# Patient Record
Sex: FEMALE | Race: WHITE | NOT HISPANIC OR LATINO | Employment: OTHER | URBAN - METROPOLITAN AREA
[De-identification: names, ages, dates, MRNs, and addresses within clinical notes are randomized per-mention and may not be internally consistent; named-entity substitution may affect disease eponyms.]

---

## 2018-08-20 RX ORDER — METOPROLOL TARTRATE 50 MG/1
TABLET, FILM COATED ORAL
Qty: 180 TABLET | Refills: 3 | Status: SHIPPED | OUTPATIENT
Start: 2018-08-20 | End: 2019-08-18 | Stop reason: SDUPTHER

## 2018-10-24 ENCOUNTER — OFFICE VISIT (OUTPATIENT)
Dept: INTERNAL MEDICINE | Facility: CLINIC | Age: 68
End: 2018-10-24

## 2018-10-24 VITALS
DIASTOLIC BLOOD PRESSURE: 78 MMHG | BODY MASS INDEX: 35.63 KG/M2 | WEIGHT: 227 LBS | SYSTOLIC BLOOD PRESSURE: 128 MMHG | HEIGHT: 67 IN

## 2018-10-24 DIAGNOSIS — I25.10 CORONARY ARTERY DISEASE INVOLVING NATIVE CORONARY ARTERY OF NATIVE HEART WITHOUT ANGINA PECTORIS: ICD-10-CM

## 2018-10-24 DIAGNOSIS — R80.9 MICROALBUMINURIA DUE TO TYPE 2 DIABETES MELLITUS (HCC): ICD-10-CM

## 2018-10-24 DIAGNOSIS — E11.29 CONTROLLED TYPE 2 DIABETES MELLITUS WITH MICROALBUMINURIA, WITHOUT LONG-TERM CURRENT USE OF INSULIN (HCC): Primary | ICD-10-CM

## 2018-10-24 DIAGNOSIS — N18.4 CKD (CHRONIC KIDNEY DISEASE) STAGE 4, GFR 15-29 ML/MIN (HCC): Chronic | ICD-10-CM

## 2018-10-24 DIAGNOSIS — R80.9 CONTROLLED TYPE 2 DIABETES MELLITUS WITH MICROALBUMINURIA, WITHOUT LONG-TERM CURRENT USE OF INSULIN (HCC): Primary | ICD-10-CM

## 2018-10-24 DIAGNOSIS — E78.01 FAMILIAL HYPERCHOLESTEROLEMIA: ICD-10-CM

## 2018-10-24 DIAGNOSIS — I10 HTN (HYPERTENSION), BENIGN: ICD-10-CM

## 2018-10-24 DIAGNOSIS — E11.29 MICROALBUMINURIA DUE TO TYPE 2 DIABETES MELLITUS (HCC): ICD-10-CM

## 2018-10-24 PROBLEM — G43.001 MIGRAINE WITHOUT AURA AND WITH STATUS MIGRAINOSUS, NOT INTRACTABLE: Status: ACTIVE | Noted: 2017-10-19

## 2018-10-24 PROBLEM — L30.9 DERMATITIS: Status: ACTIVE | Noted: 2017-10-19

## 2018-10-24 PROBLEM — Z87.81 HISTORY OF FRACTURE OF LEFT HIP: Status: ACTIVE | Noted: 2017-10-19

## 2018-10-24 PROBLEM — L29.9 PRURITIC DERMATITIS: Status: ACTIVE | Noted: 2017-10-19

## 2018-10-24 PROBLEM — E78.5 HYPERLIPIDEMIA: Status: ACTIVE | Noted: 2018-10-24

## 2018-10-24 PROBLEM — J45.909 ASTHMA: Status: ACTIVE | Noted: 2018-10-24

## 2018-10-24 PROBLEM — M62.838 MUSCLE SPASMS OF LOWER EXTREMITY: Status: ACTIVE | Noted: 2017-10-19

## 2018-10-24 PROBLEM — Q74.2 CONGENITAL FOOT ABNORMALITY: Status: ACTIVE | Noted: 2017-04-24

## 2018-10-24 PROCEDURE — 99214 OFFICE O/P EST MOD 30 MIN: CPT | Performed by: INTERNAL MEDICINE

## 2018-10-24 RX ORDER — ALBUTEROL SULFATE 90 UG/1
2 AEROSOL, METERED RESPIRATORY (INHALATION)
COMMUNITY
Start: 2018-04-18 | End: 2020-12-03 | Stop reason: SDUPTHER

## 2018-10-24 RX ORDER — ATORVASTATIN CALCIUM 40 MG/1
TABLET, FILM COATED ORAL
COMMUNITY
Start: 2018-01-18 | End: 2019-01-15 | Stop reason: SDUPTHER

## 2018-10-24 RX ORDER — HYDROXYZINE HYDROCHLORIDE 10 MG/1
10 TABLET, FILM COATED ORAL
COMMUNITY
Start: 2015-06-17

## 2018-10-24 RX ORDER — TORSEMIDE 100 MG/1
50 TABLET ORAL
COMMUNITY
Start: 2018-04-20 | End: 2019-10-31 | Stop reason: SDUPTHER

## 2018-10-24 RX ORDER — SUMATRIPTAN 100 MG/1
100 TABLET, FILM COATED ORAL
COMMUNITY
Start: 2018-04-19 | End: 2019-01-15 | Stop reason: SDUPTHER

## 2018-10-24 RX ORDER — TRIAMCINOLONE ACETONIDE 1 MG/G
CREAM TOPICAL
COMMUNITY
Start: 2018-04-19 | End: 2019-01-28 | Stop reason: SDUPTHER

## 2018-10-24 RX ORDER — CETIRIZINE HYDROCHLORIDE 10 MG/1
10 TABLET ORAL
COMMUNITY

## 2018-10-24 RX ORDER — LANCETS
EACH MISCELLANEOUS
COMMUNITY
Start: 2017-02-03

## 2018-10-24 RX ORDER — BETAMETHASONE DIPROPIONATE 0.5 MG/G
CREAM TOPICAL
COMMUNITY
Start: 2017-03-29

## 2018-10-24 RX ORDER — MONTELUKAST SODIUM 10 MG/1
TABLET ORAL
COMMUNITY
Start: 2018-10-15 | End: 2019-01-15 | Stop reason: SDUPTHER

## 2018-10-24 RX ORDER — CLOBETASOL PROPIONATE 0.5 MG/G
CREAM TOPICAL
COMMUNITY
Start: 2018-07-03

## 2018-10-24 NOTE — PROGRESS NOTES
Subjective   Suzanna Denny is a 68 y.o. female. Presents with a chief complaint of DM2, CKDIII, Microalbuminuria, HTN, Hyperlipidemia, CAD, here for follow up. Will do a full set labs.    History of Present Illness     The following portions of the patient's history were reviewed and updated as appropriate: allergies, current medications, past family history, past medical history, past social history, past surgical history and problem list.    Review of Systems   Constitutional: Negative.    HENT: Negative.    Eyes: Negative.    Respiratory: Negative.    Cardiovascular: Negative.    Gastrointestinal: Negative.    Endocrine: Negative.    Genitourinary: Negative.    Musculoskeletal: Positive for arthralgias.   Skin: Negative.    Allergic/Immunologic: Negative.    Neurological: Negative.    Hematological: Negative.    Psychiatric/Behavioral: Negative.        Objective   Physical Exam   Constitutional: She is oriented to person, place, and time. She appears well-developed and well-nourished.   HENT:   Head: Normocephalic and atraumatic.   Eyes: Pupils are equal, round, and reactive to light. EOM are normal.   Neck: Normal range of motion. Neck supple.   Cardiovascular: Normal rate, regular rhythm and normal heart sounds.    Pulmonary/Chest: Effort normal and breath sounds normal.   Abdominal: Soft. Bowel sounds are normal.   Musculoskeletal: Normal range of motion.   Neurological: She is alert and oriented to person, place, and time.   Skin: Skin is warm and dry.   Psychiatric: She has a normal mood and affect. Her behavior is normal. Judgment and thought content normal.   Nursing note and vitals reviewed.        Assessment/Plan   Suzanna was seen today for hypertension and hyperlipidemia.    Diagnoses and all orders for this visit:    Controlled type 2 diabetes mellitus with microalbuminuria, without long-term current use of insulin (CMS/[facility])  Comments:  will check an A1c  Orders:  -     Hemoglobin A1c;  Future    Microalbuminuria due to type 2 diabetes mellitus (CMS/[facility])  Comments:  tighten up diabetic control    CKD (chronic kidney disease) stage 4, GFR 15-29 ml/min (CMS/[facility])  Comments:  check a CMP    Familial hypercholesterolemia  Comments:  will check lipids  Orders:  -     Lipid Panel; Future  -     Comprehensive Metabolic Panel; Future    HTN (hypertension), benign  Comments:  well controlled  Orders:  -     Comprehensive Metabolic Panel; Future  -     TSH; Future  -     CBC & Differential; Future    Coronary artery disease involving native coronary artery of native heart without angina pectoris  Comments:  stable for now

## 2018-10-26 LAB
ALBUMIN SERPL-MCNC: 4.6 G/DL (ref 3.5–5.2)
ALBUMIN/GLOB SERPL: 2 G/DL
ALP SERPL-CCNC: 111 U/L (ref 39–117)
ALT SERPL-CCNC: 21 U/L (ref 1–33)
AST SERPL-CCNC: 19 U/L (ref 1–32)
BASOPHILS # BLD AUTO: 0.05 10*3/MM3 (ref 0–0.2)
BASOPHILS NFR BLD AUTO: 0.7 % (ref 0–1.5)
BILIRUB SERPL-MCNC: 0.4 MG/DL (ref 0.1–1.2)
BUN SERPL-MCNC: 59 MG/DL (ref 8–23)
BUN/CREAT SERPL: 14.9 (ref 7–25)
CALCIUM SERPL-MCNC: 9.2 MG/DL (ref 8.6–10.5)
CHLORIDE SERPL-SCNC: 110 MMOL/L (ref 98–107)
CHOLEST SERPL-MCNC: 118 MG/DL (ref 0–200)
CO2 SERPL-SCNC: 20.2 MMOL/L (ref 22–29)
CREAT SERPL-MCNC: 3.95 MG/DL (ref 0.57–1)
EOSINOPHIL # BLD AUTO: 0.41 10*3/MM3 (ref 0–0.7)
EOSINOPHIL # BLD AUTO: 5.5 % (ref 0.3–6.2)
ERYTHROCYTE [DISTWIDTH] IN BLOOD BY AUTOMATED COUNT: 13.7 % (ref 11.7–13)
GLOBULIN SER CALC-MCNC: 2.3 GM/DL
GLUCOSE SERPL-MCNC: 97 MG/DL (ref 65–99)
HBA1C MFR BLD: 5.2 % (ref 4.8–5.6)
HCT VFR BLD AUTO: 36.4 % (ref 35.6–45.5)
HDLC SERPL-MCNC: 36 MG/DL (ref 40–60)
HGB BLD-MCNC: 11.4 G/DL (ref 11.9–15.5)
IMM GRANULOCYTES # BLD: 0.02 10*3/MM3 (ref 0–0.03)
IMM GRANULOCYTES NFR BLD: 0.3 % (ref 0–0.5)
LDLC SERPL CALC-MCNC: 63 MG/DL (ref 0–100)
LYMPHOCYTES # BLD AUTO: 0.96 10*3/MM3 (ref 0.9–4.8)
LYMPHOCYTES NFR BLD AUTO: 12.8 % (ref 19.6–45.3)
MCH RBC QN AUTO: 30.1 PG (ref 26.9–32)
MCHC RBC AUTO-ENTMCNC: 31.3 G/DL (ref 32.4–36.3)
MCV RBC AUTO: 96 FL (ref 80.5–98.2)
MONOCYTES # BLD AUTO: 0.81 10*3/MM3 (ref 0.2–1.2)
MONOCYTES NFR BLD AUTO: 10.8 % (ref 5–12)
NEUTROPHILS # BLD AUTO: 5.29 10*3/MM3 (ref 1.9–8.1)
NEUTROPHILS NFR BLD AUTO: 70.2 % (ref 42.7–76)
NRBC BLD AUTO-RTO: 0 /100 WBC (ref 0–0)
PLATELET # BLD AUTO: 170 10*3/MM3 (ref 140–500)
POTASSIUM SERPL-SCNC: 5.5 MMOL/L (ref 3.5–5.2)
PROT SERPL-MCNC: 6.9 G/DL (ref 6–8.5)
RBC # BLD AUTO: 3.79 10*6/MM3 (ref 3.9–5.2)
SODIUM SERPL-SCNC: 147 MMOL/L (ref 136–145)
TRIGL SERPL-MCNC: 94 MG/DL (ref 0–150)
TSH SERPL-ACNC: 1.39 MIU/ML (ref 0.27–4.2)
VLDLC SERPL-MCNC: 18.8 MG/DL (ref 5–40)
WBC # BLD AUTO: 7.52 10*3/MM3 (ref 4.5–10.7)

## 2018-12-31 ENCOUNTER — TELEPHONE (OUTPATIENT)
Dept: INTERNAL MEDICINE | Facility: CLINIC | Age: 68
End: 2018-12-31

## 2018-12-31 RX ORDER — OSELTAMIVIR PHOSPHATE 75 MG/1
75 CAPSULE ORAL 2 TIMES DAILY
Qty: 10 CAPSULE | Refills: 0 | Status: SHIPPED | OUTPATIENT
Start: 2018-12-31 | End: 2019-01-15

## 2018-12-31 NOTE — TELEPHONE ENCOUNTER
----- Message from Manuela Cortez sent at 12/31/2018 10:28 AM EST -----  Contact: Patient  Patient calling states she has the flu and had has it since Friday would like something called in. Patient states she is coughing chest hurts body aches, headache, diarrhea.  Please advise    Patient:[phone]    Pharmacy:[facility] [address] [place] [address] AT [facility] [phone] [facility] [phone]

## 2019-01-15 ENCOUNTER — TELEPHONE (OUTPATIENT)
Dept: INTERNAL MEDICINE | Facility: CLINIC | Age: 69
End: 2019-01-15

## 2019-01-15 DIAGNOSIS — R51.9 INTRACTABLE HEADACHE, UNSPECIFIED CHRONICITY PATTERN, UNSPECIFIED HEADACHE TYPE: Primary | ICD-10-CM

## 2019-01-15 DIAGNOSIS — J30.9 ALLERGIC RHINITIS, UNSPECIFIED SEASONALITY, UNSPECIFIED TRIGGER: ICD-10-CM

## 2019-01-15 DIAGNOSIS — E78.5 HYPERLIPIDEMIA, ACQUIRED: ICD-10-CM

## 2019-01-15 RX ORDER — SUMATRIPTAN 100 MG/1
100 TABLET, FILM COATED ORAL ONCE
Qty: 9 TABLET | Refills: 1 | Status: SHIPPED | OUTPATIENT
Start: 2019-01-15 | End: 2019-01-15

## 2019-01-15 RX ORDER — ATORVASTATIN CALCIUM 40 MG/1
40 TABLET, FILM COATED ORAL DAILY
Qty: 90 TABLET | Refills: 1 | Status: SHIPPED | OUTPATIENT
Start: 2019-01-15 | End: 2019-01-28 | Stop reason: SDUPTHER

## 2019-01-15 RX ORDER — MONTELUKAST SODIUM 10 MG/1
10 TABLET ORAL NIGHTLY
Qty: 90 TABLET | Refills: 1 | Status: SHIPPED | OUTPATIENT
Start: 2019-01-15 | End: 2019-01-28 | Stop reason: SDUPTHER

## 2019-01-15 NOTE — TELEPHONE ENCOUNTER
----- Message from Elizabeth Stein sent at 1/15/2019 11:17 AM EST -----  Contact: pt  Pt is calling for a refill     FOR  montelukast (SINGULAIR) 10 MG tablet  SUMAtriptan (IMITREX) 100 MG tablet  atorvastatin (LIPITOR) 40 MG tablet    Patient requests RX SENT TO   [facility] - [address] [phone] [facility] [phone] FX      Caller# [phone]     Please and thank you.

## 2019-01-28 ENCOUNTER — OFFICE VISIT (OUTPATIENT)
Dept: INTERNAL MEDICINE | Facility: CLINIC | Age: 69
End: 2019-01-28

## 2019-01-28 VITALS
DIASTOLIC BLOOD PRESSURE: 82 MMHG | WEIGHT: 224 LBS | SYSTOLIC BLOOD PRESSURE: 140 MMHG | HEIGHT: 67 IN | BODY MASS INDEX: 35.16 KG/M2

## 2019-01-28 DIAGNOSIS — I10 ESSENTIAL HYPERTENSION: Primary | Chronic | ICD-10-CM

## 2019-01-28 DIAGNOSIS — N18.4 CKD (CHRONIC KIDNEY DISEASE) STAGE 4, GFR 15-29 ML/MIN (HCC): ICD-10-CM

## 2019-01-28 DIAGNOSIS — J30.9 ALLERGIC RHINITIS, UNSPECIFIED SEASONALITY, UNSPECIFIED TRIGGER: ICD-10-CM

## 2019-01-28 DIAGNOSIS — E78.5 HYPERLIPIDEMIA, ACQUIRED: ICD-10-CM

## 2019-01-28 DIAGNOSIS — E11.29 CONTROLLED TYPE 2 DIABETES MELLITUS WITH MICROALBUMINURIA, WITHOUT LONG-TERM CURRENT USE OF INSULIN (HCC): ICD-10-CM

## 2019-01-28 DIAGNOSIS — E11.29 MICROALBUMINURIA DUE TO TYPE 2 DIABETES MELLITUS (HCC): ICD-10-CM

## 2019-01-28 DIAGNOSIS — E78.01 FAMILIAL HYPERCHOLESTEROLEMIA: Chronic | ICD-10-CM

## 2019-01-28 DIAGNOSIS — R80.9 CONTROLLED TYPE 2 DIABETES MELLITUS WITH MICROALBUMINURIA, WITHOUT LONG-TERM CURRENT USE OF INSULIN (HCC): ICD-10-CM

## 2019-01-28 DIAGNOSIS — R80.9 MICROALBUMINURIA DUE TO TYPE 2 DIABETES MELLITUS (HCC): ICD-10-CM

## 2019-01-28 LAB
ALBUMIN SERPL-MCNC: 4 G/DL (ref 3.5–5.2)
ALBUMIN/GLOB SERPL: 1.7 G/DL
ALP SERPL-CCNC: 102 U/L (ref 39–117)
ALT SERPL-CCNC: 19 U/L (ref 1–33)
AST SERPL-CCNC: 16 U/L (ref 1–32)
BILIRUB SERPL-MCNC: 0.4 MG/DL (ref 0.1–1.2)
BUN SERPL-MCNC: 47 MG/DL (ref 8–23)
BUN/CREAT SERPL: 12.9 (ref 7–25)
CALCIUM SERPL-MCNC: 9.2 MG/DL (ref 8.6–10.5)
CHLORIDE SERPL-SCNC: 113 MMOL/L (ref 98–107)
CHOLEST SERPL-MCNC: 128 MG/DL (ref 0–200)
CO2 SERPL-SCNC: 14.4 MMOL/L (ref 22–29)
CREAT SERPL-MCNC: 3.65 MG/DL (ref 0.57–1)
GLOBULIN SER CALC-MCNC: 2.4 GM/DL
GLUCOSE SERPL-MCNC: 107 MG/DL (ref 65–99)
HDLC SERPL-MCNC: 40 MG/DL (ref 40–60)
LDLC SERPL CALC-MCNC: 68 MG/DL (ref 0–100)
POTASSIUM SERPL-SCNC: 5.9 MMOL/L (ref 3.5–5.2)
PROT SERPL-MCNC: 6.4 G/DL (ref 6–8.5)
SODIUM SERPL-SCNC: 144 MMOL/L (ref 136–145)
TRIGL SERPL-MCNC: 101 MG/DL (ref 0–150)
VLDLC SERPL-MCNC: 20.2 MG/DL (ref 5–40)

## 2019-01-28 PROCEDURE — 99214 OFFICE O/P EST MOD 30 MIN: CPT | Performed by: INTERNAL MEDICINE

## 2019-01-28 PROCEDURE — 36415 COLL VENOUS BLD VENIPUNCTURE: CPT | Performed by: INTERNAL MEDICINE

## 2019-01-28 RX ORDER — ATORVASTATIN CALCIUM 40 MG/1
40 TABLET, FILM COATED ORAL DAILY
Qty: 90 TABLET | Refills: 3 | Status: SHIPPED | OUTPATIENT
Start: 2019-01-28 | End: 2019-12-30

## 2019-01-28 RX ORDER — PANTOPRAZOLE SODIUM 40 MG/1
40 TABLET, DELAYED RELEASE ORAL DAILY
Qty: 90 TABLET | Refills: 1 | Status: SHIPPED | OUTPATIENT
Start: 2019-01-28 | End: 2020-01-27

## 2019-01-28 RX ORDER — SUMATRIPTAN 100 MG/1
100 TABLET, FILM COATED ORAL
COMMUNITY
Start: 2018-04-19 | End: 2019-01-28

## 2019-01-28 RX ORDER — BLOOD-GLUCOSE METER
EACH MISCELLANEOUS
Refills: 5 | COMMUNITY
Start: 2018-10-30

## 2019-01-28 RX ORDER — MONTELUKAST SODIUM 10 MG/1
10 TABLET ORAL NIGHTLY
Qty: 90 TABLET | Refills: 3 | Status: SHIPPED | OUTPATIENT
Start: 2019-01-28 | End: 2019-12-30

## 2019-01-28 RX ORDER — SUMATRIPTAN 100 MG/1
100 TABLET, FILM COATED ORAL
COMMUNITY
End: 2019-01-28 | Stop reason: SDUPTHER

## 2019-01-28 RX ORDER — SUMATRIPTAN 100 MG/1
100 TABLET, FILM COATED ORAL
Qty: 9 TABLET | Refills: 3 | Status: SHIPPED | OUTPATIENT
Start: 2019-01-28 | End: 2019-09-24 | Stop reason: SDUPTHER

## 2019-01-28 RX ORDER — TRIAMCINOLONE ACETONIDE 1 MG/G
CREAM TOPICAL 2 TIMES DAILY
Qty: 80 G | Refills: 1 | Status: SHIPPED | OUTPATIENT
Start: 2019-01-28 | End: 2019-09-24 | Stop reason: SDUPTHER

## 2019-01-28 NOTE — PROGRESS NOTES
Subjective   Suzanna Denny is a 68 y.o. female. Presents with a chief complaint of DM2, microalbuminuria, hyperlipidemia, HTN, CKD4, here for follow up and review.    History of Present Illness all of her current medical issues are well controlled    The following portions of the patient's history were reviewed and updated as appropriate: allergies, current medications, past family history, past medical history, past social history, past surgical history and problem list.    Review of Systems   Constitutional: Positive for fatigue.   HENT: Positive for congestion.    Eyes: Negative.    Respiratory: Negative.    Cardiovascular: Negative.    Gastrointestinal: Negative.    Endocrine: Negative.    Genitourinary: Negative.    Musculoskeletal: Positive for arthralgias.   Skin: Negative.    Allergic/Immunologic: Negative.    Neurological: Negative.    Hematological: Negative.    Psychiatric/Behavioral: Negative.        Objective   Physical Exam   Constitutional: She is oriented to person, place, and time. She appears well-developed and well-nourished.   HENT:   Head: Normocephalic and atraumatic.   Eyes: EOM are normal. Pupils are equal, round, and reactive to light.   Neck: Normal range of motion. Neck supple.   Cardiovascular: Normal rate, regular rhythm and normal heart sounds.   Pulmonary/Chest: Effort normal and breath sounds normal.   Abdominal: Soft. Bowel sounds are normal.   Musculoskeletal:   Diffuse arthropathy   Neurological: She is alert and oriented to person, place, and time.   Skin: Skin is warm and dry.   Psychiatric: She has a normal mood and affect. Her behavior is normal. Judgment and thought content normal.   Nursing note and vitals reviewed.        Assessment/Plan   Suzanna was seen today for coronary artery disease, diabetes, hyperlipidemia and hypertension.    Diagnoses and all orders for this visit:    Essential hypertension  Comments:  well controlled  Orders:  -     Comprehensive metabolic panel;  Future    Familial hypercholesterolemia  Comments:  will check a lipid panel  Orders:  -     Lipid panel; Future    Controlled type 2 diabetes mellitus with microalbuminuria, without long-term current use of insulin (CMS/[facility])  Comments:  check an A1c  Orders:  -     Hemoglobin A1c; Future    Microalbuminuria due to type 2 diabetes mellitus (CMS/[facility])  Comments:  check a UA    CKD (chronic kidney disease) stage 4, GFR 15-29 ml/min (CMS/[facility])  Comments:  check a CMP    Other orders  -     SUMAtriptan (IMITREX) 100 MG tablet; Take one tablet at onset of headache. May repeat dose one time in 2 hours if headache not relieved. Take one tablet at onset of headache

## 2019-01-29 LAB — HBA1C MFR BLD: 6.1 % (ref 4.8–5.6)

## 2019-01-30 ENCOUNTER — TELEPHONE (OUTPATIENT)
Dept: INTERNAL MEDICINE | Facility: CLINIC | Age: 69
End: 2019-01-30

## 2019-01-30 NOTE — TELEPHONE ENCOUNTER
Pt informed of information given from [facility].  She will call [facility] and check with them to see if providers have changed.  She did get Lantus from [facility] a few days ago, so she does not know why it would change.  She will call back to let us know if we need to send the prescriptions elsewhere.

## 2019-01-30 NOTE — TELEPHONE ENCOUNTER
Patient is calling back stating that she has talked to the pharmacy multiple times and she is calling the office again to try and get her medication sent in, as they are telling her they do not have it.      ----- Message from Selena Caraballo LPN sent at 1/29/2019 12:19 PM EST -----  Contact: Pt   I resent them yesterday while she was here  ----- Message -----  From: Elizabeth Stein  Sent: 1/29/2019  10:58 AM  To: Susan Nick MA    Pt is calling for a refill     FOR  -SUMAtriptan (IMITREX) 100 MG tablet  -Pantoprazole Sodium 40 mg Oral Daily   -Atorvastatin Calcium 40 mg Oral Daily, These were sent in on 1/15/19, pt states you did not receive 40 mg Oral Daily   -Triamcinolone Acetonide 0.1 % Topical 2 Times Daily Topical   -Montelukast Sodium    Patient requests RX SENT TO   [facility] - [address] [phone] [facility] [phone] FX      Caller# [phone]     Please and thank you.

## 2019-01-30 NOTE — TELEPHONE ENCOUNTER
I spoke with Belen at [facility].  They did receive the prescriptions and the pharmacy input them in their system, but note on chart states [facility] is no longer her home delivery provider and pt needs to contact benefit provider to find out who mail order pharmacy now is.

## 2019-01-31 RX ORDER — TRIAMCINOLONE ACETONIDE 1 MG/G
CREAM TOPICAL 2 TIMES DAILY
Qty: 80 G | Refills: 1 | Status: CANCELLED | OUTPATIENT
Start: 2019-01-31

## 2019-01-31 NOTE — TELEPHONE ENCOUNTER
Received call from Shawanda who works for patient's insurance RX benefits. Still having problems with [facility]. Called [facility] again. Spoke with Juanita. She states refills are waiting MD approvals. Advised Juanita this has been sent twice electronically. Something significantly wrong on [facility] end. This nurse hand wrote new RX prescriptions and faxed to  Pharmacy. Received 'OK' fax confirmation.  Called Shawanda and patient to update them. Patient to let us know if still a problem. Patient grateful and verbalized understanding.

## 2019-02-11 ENCOUNTER — TELEPHONE (OUTPATIENT)
Dept: INTERNAL MEDICINE | Facility: CLINIC | Age: 69
End: 2019-02-11

## 2019-02-11 RX ORDER — ALBUTEROL SULFATE 90 UG/1
2 AEROSOL, METERED RESPIRATORY (INHALATION) EVERY 4 HOURS PRN
Qty: 18 G | Refills: 3 | Status: SHIPPED | OUTPATIENT
Start: 2019-02-11

## 2019-02-11 NOTE — TELEPHONE ENCOUNTER
----- Message from Elizabeth Stein sent at 2/11/2019  8:44 AM EST -----  Contact: pt  Pt is calling for a refill     FOR  Albuterol- for nebulizer.      Patient requests RX SENT TO   [facility] [address] [place] [address] AT [facility] - [phone] [facility] [phone] FX      Caller# [phone]    Please and thank you.

## 2019-04-30 ENCOUNTER — OFFICE VISIT (OUTPATIENT)
Dept: INTERNAL MEDICINE | Facility: CLINIC | Age: 69
End: 2019-04-30

## 2019-04-30 VITALS
SYSTOLIC BLOOD PRESSURE: 140 MMHG | WEIGHT: 209 LBS | DIASTOLIC BLOOD PRESSURE: 90 MMHG | BODY MASS INDEX: 32.8 KG/M2 | HEIGHT: 67 IN

## 2019-04-30 DIAGNOSIS — E11.29 CONTROLLED TYPE 2 DIABETES MELLITUS WITH MICROALBUMINURIA, WITHOUT LONG-TERM CURRENT USE OF INSULIN (HCC): ICD-10-CM

## 2019-04-30 DIAGNOSIS — E11.29 MICROALBUMINURIA DUE TO TYPE 2 DIABETES MELLITUS (HCC): Chronic | ICD-10-CM

## 2019-04-30 DIAGNOSIS — N18.4 CKD (CHRONIC KIDNEY DISEASE) STAGE 4, GFR 15-29 ML/MIN (HCC): Chronic | ICD-10-CM

## 2019-04-30 DIAGNOSIS — I25.10 CORONARY ARTERY DISEASE INVOLVING NATIVE CORONARY ARTERY OF NATIVE HEART WITHOUT ANGINA PECTORIS: ICD-10-CM

## 2019-04-30 DIAGNOSIS — R80.9 MICROALBUMINURIA DUE TO TYPE 2 DIABETES MELLITUS (HCC): Chronic | ICD-10-CM

## 2019-04-30 DIAGNOSIS — R80.9 CONTROLLED TYPE 2 DIABETES MELLITUS WITH MICROALBUMINURIA, WITHOUT LONG-TERM CURRENT USE OF INSULIN (HCC): ICD-10-CM

## 2019-04-30 DIAGNOSIS — I10 ESSENTIAL HYPERTENSION: Primary | ICD-10-CM

## 2019-04-30 DIAGNOSIS — E78.01 FAMILIAL HYPERCHOLESTEROLEMIA: Chronic | ICD-10-CM

## 2019-04-30 PROCEDURE — 99214 OFFICE O/P EST MOD 30 MIN: CPT | Performed by: INTERNAL MEDICINE

## 2019-04-30 NOTE — PROGRESS NOTES
Subjective   Suzanna Denny is a 68 y.o. female. Presents with a chief complaint of DM2, HTN, Hyperlipidemia, CKD3, microalbuminuria, here for follow up and review.    History of Present Illness working on controlling her diabetes    The following portions of the patient's history were reviewed and updated as appropriate: allergies, current medications, past family history, past medical history, past social history, past surgical history and problem list.    Review of Systems   Constitutional: Negative.    HENT: Negative.    Eyes: Negative.    Respiratory: Negative.    Cardiovascular: Negative.    Gastrointestinal: Negative.    Endocrine: Negative.    Genitourinary: Negative.    Musculoskeletal: Positive for arthralgias.   Skin: Negative.    Allergic/Immunologic: Negative.    Neurological: Negative.    Hematological: Negative.    Psychiatric/Behavioral: Negative.        Objective   Physical Exam   Constitutional: She appears well-developed and well-nourished.   HENT:   Head: Normocephalic and atraumatic.   Eyes: EOM are normal. Pupils are equal, round, and reactive to light.   Neck: Normal range of motion. Neck supple.   Cardiovascular: Normal rate, regular rhythm and normal heart sounds.   Pulmonary/Chest: Effort normal and breath sounds normal.   Abdominal: Soft. Bowel sounds are normal.   Musculoskeletal: Normal range of motion.   Neurological: She is alert.   Skin: Skin is warm.   Psychiatric: She has a normal mood and affect.   Nursing note and vitals reviewed.        Assessment/Plan   Suzanna was seen today for hypertension and diabetes.    Diagnoses and all orders for this visit:    Essential hypertension  Comments:  well controlled    Familial hypercholesterolemia  Comments:  well controlled    Coronary artery disease involving native coronary artery of native heart without angina pectoris  Comments:  stable for now    Controlled type 2 diabetes mellitus with microalbuminuria, without long-term current use of  insulin (CMS/[facility])  Comments:  working with Endocrinology    Microalbuminuria due to type 2 diabetes mellitus (CMS/[facility])  Comments:  endocrinology follow up    CKD (chronic kidney disease) stage 4, GFR 15-29 ml/min (CMS/[facility])  Comments:  stable for now

## 2019-08-19 RX ORDER — METOPROLOL TARTRATE 50 MG/1
TABLET, FILM COATED ORAL
Qty: 180 TABLET | Refills: 4 | Status: SHIPPED | OUTPATIENT
Start: 2019-08-19 | End: 2020-11-11

## 2019-08-23 ENCOUNTER — HOSPITAL ENCOUNTER (OUTPATIENT)
Dept: CT IMAGING | Facility: HOSPITAL | Age: 69
End: 2019-08-23

## 2019-09-24 RX ORDER — SUMATRIPTAN 100 MG/1
TABLET, FILM COATED ORAL
Qty: 27 TABLET | Refills: 3 | Status: SHIPPED | OUTPATIENT
Start: 2019-09-24 | End: 2020-07-29

## 2019-09-24 RX ORDER — TRIAMCINOLONE ACETONIDE 1 MG/G
CREAM TOPICAL
Qty: 80 G | Refills: 3 | Status: SHIPPED | OUTPATIENT
Start: 2019-09-24 | End: 2020-07-29

## 2019-10-31 ENCOUNTER — OFFICE VISIT (OUTPATIENT)
Dept: INTERNAL MEDICINE | Facility: CLINIC | Age: 69
End: 2019-10-31

## 2019-10-31 VITALS
HEIGHT: 66 IN | DIASTOLIC BLOOD PRESSURE: 64 MMHG | SYSTOLIC BLOOD PRESSURE: 138 MMHG | BODY MASS INDEX: 37.12 KG/M2 | HEART RATE: 53 BPM | OXYGEN SATURATION: 98 % | WEIGHT: 231 LBS

## 2019-10-31 DIAGNOSIS — E11.29 CONTROLLED TYPE 2 DIABETES MELLITUS WITH MICROALBUMINURIA, WITHOUT LONG-TERM CURRENT USE OF INSULIN (HCC): Chronic | ICD-10-CM

## 2019-10-31 DIAGNOSIS — R80.9 CONTROLLED TYPE 2 DIABETES MELLITUS WITH MICROALBUMINURIA, WITHOUT LONG-TERM CURRENT USE OF INSULIN (HCC): Chronic | ICD-10-CM

## 2019-10-31 DIAGNOSIS — I10 ESSENTIAL HYPERTENSION: Primary | Chronic | ICD-10-CM

## 2019-10-31 DIAGNOSIS — N18.4 CKD (CHRONIC KIDNEY DISEASE) STAGE 4, GFR 15-29 ML/MIN (HCC): ICD-10-CM

## 2019-10-31 DIAGNOSIS — E78.01 FAMILIAL HYPERCHOLESTEROLEMIA: Chronic | ICD-10-CM

## 2019-10-31 PROCEDURE — 99214 OFFICE O/P EST MOD 30 MIN: CPT | Performed by: INTERNAL MEDICINE

## 2019-10-31 RX ORDER — TORSEMIDE 100 MG/1
50 TABLET ORAL DAILY
Qty: 90 TABLET | Refills: 2 | Status: SHIPPED | OUTPATIENT
Start: 2019-10-31 | End: 2021-01-25

## 2019-10-31 NOTE — PROGRESS NOTES
Subjective   Alejandra Denny is a 69 y.o. female.  Patient presents with chief complaint of essential hypertension, type 2 diabetes mellitus, chronic pruritus, hyperlipidemia, chronic kidney disease, chronic low back pain here for evaluation treatment and lab check.  The patient stays very active but is not particularly concerned about diet or strictly controlling her diabetes and we have had multiple discussions on improving her diet exercise and diabetic attendance issues.    History of Present Illness ongoing issues with arthritis    The following portions of the patient's history were reviewed and updated as appropriate: allergies, current medications, past family history, past medical history, past social history, past surgical history and problem list.    Review of Systems   Constitutional: Negative.    HENT: Positive for congestion.    Eyes: Negative.    Respiratory: Negative.    Cardiovascular: Negative.    Gastrointestinal: Negative.    Endocrine: Negative.    Genitourinary: Negative.    Musculoskeletal: Positive for arthralgias.   Skin: Negative.    Allergic/Immunologic: Negative.    Neurological: Negative.    Hematological: Negative.    Psychiatric/Behavioral: Negative.     doing very well overall except for arthritic changes throughout    Objective   Physical Exam   Constitutional: She appears well-developed and well-nourished.   HENT:   Head: Normocephalic and atraumatic.   Eyes: Pupils are equal, round, and reactive to light.   Neck: Normal range of motion.   Cardiovascular: Normal rate, regular rhythm and normal heart sounds.   Pulmonary/Chest: Effort normal and breath sounds normal.   Abdominal: Soft. Bowel sounds are normal.   Musculoskeletal: Normal range of motion.   Neurological: She is alert.   Skin: Skin is warm.   Psychiatric: She has a normal mood and affect.   Nursing note and vitals reviewed.        Assessment/Plan   Alejandra was seen today for diabetes and hyperlipidemia.    Diagnoses and  all orders for this visit:    Essential hypertension  Comments:  well controlled  Orders:  -     torsemide (DEMADEX) 100 MG tablet; Take 0.5 tablets by mouth Daily.    Familial hypercholesterolemia  Comments:  will check a CMP and Lipid level  Orders:  -     Lipid panel; Future    Controlled type 2 diabetes mellitus with microalbuminuria, without long-term current use of insulin (CMS/[facility])  Comments:  check an A1c  Orders:  -     Comprehensive metabolic panel; Future  -     Hemoglobin A1c; Future    CKD (chronic kidney disease) stage 4, GFR 15-29 ml/min (CMS/[facility])  Comments:  check a CMP

## 2019-12-28 DIAGNOSIS — J30.9 ALLERGIC RHINITIS, UNSPECIFIED SEASONALITY, UNSPECIFIED TRIGGER: ICD-10-CM

## 2019-12-28 DIAGNOSIS — E78.5 HYPERLIPIDEMIA, ACQUIRED: ICD-10-CM

## 2019-12-30 RX ORDER — MONTELUKAST SODIUM 10 MG/1
TABLET ORAL
Qty: 90 TABLET | Refills: 4 | Status: SHIPPED | OUTPATIENT
Start: 2019-12-30 | End: 2021-08-31 | Stop reason: SDUPTHER

## 2019-12-30 RX ORDER — ATORVASTATIN CALCIUM 40 MG/1
TABLET, FILM COATED ORAL
Qty: 90 TABLET | Refills: 4 | Status: SHIPPED | OUTPATIENT
Start: 2019-12-30 | End: 2021-08-31 | Stop reason: SDUPTHER

## 2020-01-27 RX ORDER — PANTOPRAZOLE SODIUM 40 MG/1
TABLET, DELAYED RELEASE ORAL
Qty: 90 TABLET | Refills: 4 | Status: SHIPPED | OUTPATIENT
Start: 2020-01-27 | End: 2020-12-01 | Stop reason: SDUPTHER

## 2020-02-03 ENCOUNTER — OFFICE VISIT (OUTPATIENT)
Dept: INTERNAL MEDICINE | Facility: CLINIC | Age: 70
End: 2020-02-03

## 2020-02-03 ENCOUNTER — RESULTS ENCOUNTER (OUTPATIENT)
Dept: INTERNAL MEDICINE | Facility: CLINIC | Age: 70
End: 2020-02-03

## 2020-02-03 VITALS
WEIGHT: 219 LBS | HEIGHT: 67 IN | DIASTOLIC BLOOD PRESSURE: 84 MMHG | BODY MASS INDEX: 34.37 KG/M2 | OXYGEN SATURATION: 97 % | HEART RATE: 50 BPM | SYSTOLIC BLOOD PRESSURE: 132 MMHG

## 2020-02-03 DIAGNOSIS — N18.4 CKD (CHRONIC KIDNEY DISEASE) STAGE 4, GFR 15-29 ML/MIN (HCC): ICD-10-CM

## 2020-02-03 DIAGNOSIS — R80.9 MICROALBUMINURIA DUE TO TYPE 2 DIABETES MELLITUS (HCC): ICD-10-CM

## 2020-02-03 DIAGNOSIS — E11.29 CONTROLLED TYPE 2 DIABETES MELLITUS WITH MICROALBUMINURIA, WITHOUT LONG-TERM CURRENT USE OF INSULIN (HCC): Chronic | ICD-10-CM

## 2020-02-03 DIAGNOSIS — E11.29 MICROALBUMINURIA DUE TO TYPE 2 DIABETES MELLITUS (HCC): ICD-10-CM

## 2020-02-03 DIAGNOSIS — R80.9 CONTROLLED TYPE 2 DIABETES MELLITUS WITH MICROALBUMINURIA, WITHOUT LONG-TERM CURRENT USE OF INSULIN (HCC): Chronic | ICD-10-CM

## 2020-02-03 DIAGNOSIS — I10 ESSENTIAL HYPERTENSION: Primary | Chronic | ICD-10-CM

## 2020-02-03 DIAGNOSIS — E78.01 FAMILIAL HYPERCHOLESTEROLEMIA: ICD-10-CM

## 2020-02-03 PROCEDURE — 99214 OFFICE O/P EST MOD 30 MIN: CPT | Performed by: INTERNAL MEDICINE

## 2020-02-03 NOTE — PROGRESS NOTES
"Subjective   Randolphjohana Denny is a 69 y.o. female.  Patient presents with chief complaint of type 2 diabetes mellitus, essential hypertension, hyperlipidemia, chronic microalbuminuria secondary to her diabetes, chronic kidney disease stage III-IV, here for evaluation treatment lab check and medication review.  The patient does not have any difficulty with hypoglycemic episodes stays very active overall but is not particularly attentive to her diabetes.  We have had many discussions in the past about diet exercise and proper nutrition geared toward lowering her blood sugar and none of these conversations have particularly taken hold.      /84 (BP Location: Left arm, Patient Position: Sitting, Cuff Size: Adult)   Pulse 50   Ht 168.9 cm (66.5\")   Wt 99.3 kg (219 lb)   SpO2 97%   BMI 34.82 kg/m²     Body mass index is 34.82 kg/m².    History of Present Illness doing well overall but needs a full set of labs done today    The following portions of the patient's history were reviewed and updated as appropriate: allergies, current medications, past family history, past medical history, past social history, past surgical history and problem list.    Review of Systems   Constitutional: Positive for fatigue.   HENT: Positive for congestion.    Eyes: Positive for visual disturbance.   Respiratory: Negative.    Cardiovascular: Negative.    Gastrointestinal: Negative.    Musculoskeletal: Positive for arthralgias and gait problem.   Hematological: Negative.    Psychiatric/Behavioral: Negative.        Objective   Physical Exam   Constitutional: She is oriented to person, place, and time. She appears well-developed and well-nourished.   HENT:   Head: Normocephalic and atraumatic.   Chronic low-grade sinus congestion   Eyes: Pupils are equal, round, and reactive to light. EOM are normal.   Neck: Normal range of motion. Neck supple.   Cardiovascular: Normal rate, regular rhythm and normal heart sounds.   Pulmonary/Chest: " Effort normal and breath sounds normal.   Abdominal: Soft. Bowel sounds are normal.   Musculoskeletal:   Low back pain hip and knee arthritis with somewhat unstable gait   Neurological: She is alert and oriented to person, place, and time.   Skin: Skin is warm.   Psychiatric: She has a normal mood and affect. Her behavior is normal.   Nursing note and vitals reviewed.        Assessment/Plan   Alejandra was seen today for hypertension.    Diagnoses and all orders for this visit:    Essential hypertension  Comments:  well controlled  Orders:  -     Comprehensive metabolic panel; Future    Familial hypercholesterolemia  Comments:  check an A1c and Lipid panel  Orders:  -     Lipid Panel With LDL/HDL Ratio; Future    Controlled type 2 diabetes mellitus with microalbuminuria, without long-term current use of insulin (CMS/[facility])  Comments:  diet and exercise  Orders:  -     Hemoglobin A1c; Future    Microalbuminuria due to type 2 diabetes mellitus (CMS/[facility])  Comments:  check a UA for microalbumin  Orders:  -     MicroAlbumin, Urine, Random - Urine, Clean Catch; Future    CKD (chronic kidney disease) stage 4, GFR 15-29 ml/min (CMS/[facility])  Comments:  check a CMP

## 2020-02-04 LAB
ALBUMIN SERPL-MCNC: 4.1 G/DL (ref 3.8–4.8)
ALBUMIN/CREAT UR: 691 MG/G CREAT (ref 0–29)
ALBUMIN/GLOB SERPL: 1.4 {RATIO} (ref 1.2–2.2)
ALP SERPL-CCNC: 114 IU/L (ref 39–117)
ALT SERPL-CCNC: 13 IU/L (ref 0–32)
AST SERPL-CCNC: 22 IU/L (ref 0–40)
BILIRUB SERPL-MCNC: 0.3 MG/DL (ref 0–1.2)
BUN SERPL-MCNC: 53 MG/DL (ref 8–27)
BUN/CREAT SERPL: 15 (ref 12–28)
CALCIUM SERPL-MCNC: 9.1 MG/DL (ref 8.7–10.3)
CHLORIDE SERPL-SCNC: 106 MMOL/L (ref 96–106)
CHOLEST SERPL-MCNC: 135 MG/DL (ref 100–199)
CO2 SERPL-SCNC: 16 MMOL/L (ref 20–29)
CREAT SERPL-MCNC: 3.59 MG/DL (ref 0.57–1)
CREAT UR-MCNC: 45.3 MG/DL
GLOBULIN SER CALC-MCNC: 2.9 G/DL (ref 1.5–4.5)
GLUCOSE SERPL-MCNC: ABNORMAL MG/DL
HBA1C MFR BLD: 6.1 % (ref 4.8–5.6)
HDLC SERPL-MCNC: 39 MG/DL
LDLC SERPL CALC-MCNC: 74 MG/DL (ref 0–99)
LDLC/HDLC SERPL: 1.9 RATIO (ref 0–3.2)
MICROALBUMIN UR-MCNC: 312.8 UG/ML
POTASSIUM SERPL-SCNC: ABNORMAL MMOL/L
PROT SERPL-MCNC: 7 G/DL (ref 6–8.5)
SODIUM SERPL-SCNC: 141 MMOL/L (ref 134–144)
TRIGL SERPL-MCNC: 108 MG/DL (ref 0–149)
VLDLC SERPL CALC-MCNC: 22 MG/DL (ref 5–40)

## 2020-02-08 ENCOUNTER — RESULTS ENCOUNTER (OUTPATIENT)
Dept: INTERNAL MEDICINE | Facility: CLINIC | Age: 70
End: 2020-02-08

## 2020-02-08 DIAGNOSIS — E11.29 CONTROLLED TYPE 2 DIABETES MELLITUS WITH MICROALBUMINURIA, WITHOUT LONG-TERM CURRENT USE OF INSULIN (HCC): Chronic | ICD-10-CM

## 2020-02-08 DIAGNOSIS — R80.9 CONTROLLED TYPE 2 DIABETES MELLITUS WITH MICROALBUMINURIA, WITHOUT LONG-TERM CURRENT USE OF INSULIN (HCC): Chronic | ICD-10-CM

## 2020-02-08 DIAGNOSIS — E78.01 FAMILIAL HYPERCHOLESTEROLEMIA: ICD-10-CM

## 2020-02-08 DIAGNOSIS — I10 ESSENTIAL HYPERTENSION: Chronic | ICD-10-CM

## 2020-07-29 RX ORDER — SUMATRIPTAN 100 MG/1
TABLET, FILM COATED ORAL
Qty: 27 TABLET | Refills: 8 | Status: SHIPPED | OUTPATIENT
Start: 2020-07-29

## 2020-07-29 RX ORDER — TRIAMCINOLONE ACETONIDE 1 MG/G
CREAM TOPICAL
Qty: 80 G | Refills: 8 | Status: SHIPPED | OUTPATIENT
Start: 2020-07-29

## 2020-11-11 RX ORDER — METOPROLOL TARTRATE 50 MG/1
TABLET, FILM COATED ORAL
Qty: 60 TABLET | Refills: 0 | Status: SHIPPED | OUTPATIENT
Start: 2020-11-11 | End: 2021-08-31 | Stop reason: SDUPTHER

## 2020-11-13 RX ORDER — METOPROLOL TARTRATE 50 MG/1
TABLET, FILM COATED ORAL
Qty: 60 TABLET | Refills: 11 | OUTPATIENT
Start: 2020-11-13

## 2020-12-01 ENCOUNTER — OFFICE VISIT (OUTPATIENT)
Dept: INTERNAL MEDICINE | Facility: CLINIC | Age: 70
End: 2020-12-01

## 2020-12-01 VITALS
OXYGEN SATURATION: 98 % | DIASTOLIC BLOOD PRESSURE: 90 MMHG | HEART RATE: 77 BPM | WEIGHT: 220 LBS | BODY MASS INDEX: 35.36 KG/M2 | TEMPERATURE: 97.2 F | SYSTOLIC BLOOD PRESSURE: 138 MMHG | HEIGHT: 66 IN

## 2020-12-01 DIAGNOSIS — N18.4 CKD (CHRONIC KIDNEY DISEASE) STAGE 4, GFR 15-29 ML/MIN (HCC): ICD-10-CM

## 2020-12-01 DIAGNOSIS — I10 ESSENTIAL HYPERTENSION: ICD-10-CM

## 2020-12-01 DIAGNOSIS — E78.01 FAMILIAL HYPERCHOLESTEROLEMIA: ICD-10-CM

## 2020-12-01 DIAGNOSIS — E11.29 CONTROLLED TYPE 2 DIABETES MELLITUS WITH MICROALBUMINURIA, WITHOUT LONG-TERM CURRENT USE OF INSULIN (HCC): Primary | ICD-10-CM

## 2020-12-01 DIAGNOSIS — R80.9 CONTROLLED TYPE 2 DIABETES MELLITUS WITH MICROALBUMINURIA, WITHOUT LONG-TERM CURRENT USE OF INSULIN (HCC): Primary | ICD-10-CM

## 2020-12-01 DIAGNOSIS — K26.4 DUODENAL ULCER WITH HEMORRHAGE: ICD-10-CM

## 2020-12-01 PROCEDURE — 99214 OFFICE O/P EST MOD 30 MIN: CPT | Performed by: INTERNAL MEDICINE

## 2020-12-01 RX ORDER — PANTOPRAZOLE SODIUM 40 MG/1
40 TABLET, DELAYED RELEASE ORAL 2 TIMES DAILY
Qty: 180 TABLET | Refills: 2 | Status: SHIPPED | OUTPATIENT
Start: 2020-12-01

## 2020-12-01 RX ORDER — TIZANIDINE 4 MG/1
4 TABLET ORAL EVERY 8 HOURS PRN
COMMUNITY
Start: 2020-11-02

## 2020-12-01 NOTE — PROGRESS NOTES
"Subjective   Carmel By The Searylan Denny is a 70 y.o. female.  Patient presents with chief complaint of type 2 diabetes mellitus, hyperlipidemia, hypertension, status post duodenal ulcer with hemorrhage, chronic kidney disease stage III-IV here for follow-up evaluation and treatment.  She has not terribly invested in her health care and unfortunately has the expected sequela of not taking better care of her diabetes and her self.      /90   Pulse 77   Temp 97.2 °F (36.2 °C)   Ht 168.9 cm (66.5\")   Wt 99.8 kg (220 lb)   SpO2 98%   BMI 34.98 kg/m²     Body mass index is 34.98 kg/m².    History of Present Illness ongoing issues with poorly controlled diabetes no other new complaints at this time    The following portions of the patient's history were reviewed and updated as appropriate: allergies, current medications, past family history, past medical history, past social history, past surgical history and problem list.    Review of Systems   Constitutional: Negative.    HENT: Negative.    Respiratory: Negative.    Cardiovascular: Negative.    Gastrointestinal: Negative.    Musculoskeletal: Positive for arthralgias.   Neurological: Negative.    Psychiatric/Behavioral: Negative.        Objective   Physical Exam  Vitals signs and nursing note reviewed.   Constitutional:       Appearance: Normal appearance. She is obese.   HENT:      Head: Normocephalic and atraumatic.   Cardiovascular:      Rate and Rhythm: Normal rate and regular rhythm.      Heart sounds: Normal heart sounds.   Pulmonary:      Effort: Pulmonary effort is normal.      Breath sounds: Normal breath sounds.   Abdominal:      General: Abdomen is flat. Bowel sounds are normal.      Palpations: Abdomen is soft.   Musculoskeletal:      Comments: Arthropathy of her hips knees but otherwise doing well   Neurological:      General: No focal deficit present.      Mental Status: She is alert.   Psychiatric:         Mood and Affect: Mood normal. "           Assessment/Plan   Diagnoses and all orders for this visit:    1. Controlled type 2 diabetes mellitus with microalbuminuria, without long-term current use of insulin (CMS/[facility]) (Primary)  Comments:  I have requested a CMP and an A1c    2. Duodenal ulcer with hemorrhage  Comments:  Thankfully no recent flareups  Orders:  -     pantoprazole (PROTONIX) 40 MG EC tablet; Take 1 tablet by mouth 2 (two) times a day.  Dispense: 180 tablet; Refill: 2    3. Essential hypertension  Comments:  Moderately well-controlled at this time we will continue to monitor    4. Familial hypercholesterolemia  Comments:  I have recommended a CMP and lipid panel be done  Orders:  -     Comprehensive metabolic panel; Future  -     Comprehensive metabolic panel    5. CKD (chronic kidney disease) stage 4, GFR 15-29 ml/min (CMS/[facility])  Comments:  We will continue to monitor her creatinine at every visit

## 2020-12-02 LAB
ALBUMIN SERPL-MCNC: 3.8 G/DL (ref 3.5–5.2)
ALBUMIN/GLOB SERPL: 1.7 G/DL
ALP SERPL-CCNC: 119 U/L (ref 39–117)
ALT SERPL-CCNC: 9 U/L (ref 1–33)
AST SERPL-CCNC: 12 U/L (ref 1–32)
BILIRUB SERPL-MCNC: 0.3 MG/DL (ref 0–1.2)
BUN SERPL-MCNC: 39 MG/DL (ref 8–23)
BUN/CREAT SERPL: 10.3 (ref 7–25)
CALCIUM SERPL-MCNC: 8.9 MG/DL (ref 8.6–10.5)
CHLORIDE SERPL-SCNC: 108 MMOL/L (ref 98–107)
CO2 SERPL-SCNC: 20.3 MMOL/L (ref 22–29)
CREAT SERPL-MCNC: 3.78 MG/DL (ref 0.57–1)
GLOBULIN SER CALC-MCNC: 2.3 GM/DL
GLUCOSE SERPL-MCNC: 152 MG/DL (ref 65–99)
POTASSIUM SERPL-SCNC: 4.5 MMOL/L (ref 3.5–5.2)
PROT SERPL-MCNC: 6.1 G/DL (ref 6–8.5)
SODIUM SERPL-SCNC: 141 MMOL/L (ref 136–145)

## 2020-12-03 ENCOUNTER — TELEPHONE (OUTPATIENT)
Dept: INTERNAL MEDICINE | Facility: CLINIC | Age: 70
End: 2020-12-03

## 2020-12-03 NOTE — TELEPHONE ENCOUNTER
Caller: Alejandra Denny    Relationship: Self    Best call back number: [phone]    Medication needed:   Requested Prescriptions     Pending Prescriptions Disp Refills   • albuterol sulfate  (90 Base) MCG/ACT inhaler        Sig: Inhale 2 puffs.   AND SOMETHING FOR PAIN FOR HIP. DR HINDS STATED HE WOULD PRESCRIBE SOMETHING AND PATIENT HASNT HEARD ANYTHING.    When do you need the refill by: 12-3-20      What is the patient's preferred pharmacy: [facility] - [address] [phone] [facility] [phone]

## 2020-12-04 RX ORDER — ALBUTEROL SULFATE 90 UG/1
2 AEROSOL, METERED RESPIRATORY (INHALATION) EVERY 6 HOURS PRN
Qty: 18 G | Refills: 5 | Status: SHIPPED | OUTPATIENT
Start: 2020-12-04 | End: 2021-08-31 | Stop reason: SDUPTHER

## 2021-01-23 DIAGNOSIS — I10 ESSENTIAL HYPERTENSION: Chronic | ICD-10-CM

## 2021-01-25 RX ORDER — TORSEMIDE 100 MG/1
TABLET ORAL
Qty: 90 TABLET | Refills: 0 | Status: SHIPPED | OUTPATIENT
Start: 2021-01-25 | End: 2021-08-31

## 2021-08-12 ENCOUNTER — OFFICE VISIT (OUTPATIENT)
Dept: INTERNAL MEDICINE | Facility: CLINIC | Age: 71
End: 2021-08-12

## 2021-08-12 VITALS
HEIGHT: 66 IN | OXYGEN SATURATION: 95 % | TEMPERATURE: 97.3 F | BODY MASS INDEX: 31.12 KG/M2 | RESPIRATION RATE: 16 BRPM | WEIGHT: 193.6 LBS | SYSTOLIC BLOOD PRESSURE: 138 MMHG | HEART RATE: 66 BPM | DIASTOLIC BLOOD PRESSURE: 86 MMHG

## 2021-08-12 DIAGNOSIS — R80.9 CONTROLLED TYPE 2 DIABETES MELLITUS WITH MICROALBUMINURIA, WITHOUT LONG-TERM CURRENT USE OF INSULIN (HCC): ICD-10-CM

## 2021-08-12 DIAGNOSIS — Z76.89 ENCOUNTER TO ESTABLISH CARE: Primary | ICD-10-CM

## 2021-08-12 DIAGNOSIS — J45.20 INTERMITTENT ASTHMA, UNSPECIFIED ASTHMA SEVERITY, UNSPECIFIED WHETHER COMPLICATED: ICD-10-CM

## 2021-08-12 DIAGNOSIS — N18.4 CKD (CHRONIC KIDNEY DISEASE) STAGE 4, GFR 15-29 ML/MIN (HCC): ICD-10-CM

## 2021-08-12 DIAGNOSIS — E11.29 CONTROLLED TYPE 2 DIABETES MELLITUS WITH MICROALBUMINURIA, WITHOUT LONG-TERM CURRENT USE OF INSULIN (HCC): ICD-10-CM

## 2021-08-12 DIAGNOSIS — R53.83 FATIGUE, UNSPECIFIED TYPE: ICD-10-CM

## 2021-08-12 LAB
ALBUMIN SERPL-MCNC: 3.8 G/DL (ref 3.5–5.2)
ALBUMIN/GLOB SERPL: 1.1 G/DL
ALP SERPL-CCNC: 124 U/L (ref 39–117)
ALT SERPL W P-5'-P-CCNC: 14 U/L (ref 1–33)
ANION GAP SERPL CALCULATED.3IONS-SCNC: 18.6 MMOL/L (ref 5–15)
AST SERPL-CCNC: 16 U/L (ref 1–32)
BASOPHILS # BLD AUTO: 0.04 10*3/MM3 (ref 0–0.2)
BASOPHILS NFR BLD AUTO: 0.2 % (ref 0–1.5)
BILIRUB SERPL-MCNC: 0.3 MG/DL (ref 0–1.2)
BUN SERPL-MCNC: 59 MG/DL (ref 8–23)
BUN/CREAT SERPL: 10.9 (ref 7–25)
CALCIUM SPEC-SCNC: 9.5 MG/DL (ref 8.6–10.5)
CHLORIDE SERPL-SCNC: 101 MMOL/L (ref 98–107)
CHOLEST SERPL-MCNC: 112 MG/DL (ref 0–200)
CO2 SERPL-SCNC: 13.4 MMOL/L (ref 22–29)
CREAT SERPL-MCNC: 5.39 MG/DL (ref 0.57–1)
DEPRECATED RDW RBC AUTO: 44.4 FL (ref 37–54)
EOSINOPHIL # BLD AUTO: 0.05 10*3/MM3 (ref 0–0.4)
EOSINOPHIL NFR BLD AUTO: 0.3 % (ref 0.3–6.2)
ERYTHROCYTE [DISTWIDTH] IN BLOOD BY AUTOMATED COUNT: 13.2 % (ref 12.3–15.4)
GFR SERPL CREATININE-BSD FRML MDRD: 8 ML/MIN/1.73
GFR SERPL CREATININE-BSD FRML MDRD: ABNORMAL ML/MIN/{1.73_M2}
GLOBULIN UR ELPH-MCNC: 3.6 GM/DL
GLUCOSE SERPL-MCNC: 191 MG/DL (ref 65–99)
HBA1C MFR BLD: 6.97 % (ref 4.8–5.6)
HCT VFR BLD AUTO: 37.8 % (ref 34–46.6)
HDLC SERPL-MCNC: 33 MG/DL (ref 40–60)
HGB BLD-MCNC: 11.7 G/DL (ref 12–15.9)
LDLC SERPL CALC-MCNC: 46 MG/DL (ref 0–100)
LDLC/HDLC SERPL: 1.18 {RATIO}
LYMPHOCYTES # BLD AUTO: 0.59 10*3/MM3 (ref 0.7–3.1)
LYMPHOCYTES NFR BLD AUTO: 3.6 % (ref 19.6–45.3)
MCH RBC QN AUTO: 28.3 PG (ref 26.6–33)
MCHC RBC AUTO-ENTMCNC: 31 G/DL (ref 31.5–35.7)
MCV RBC AUTO: 91.5 FL (ref 79–97)
MONOCYTES # BLD AUTO: 1.24 10*3/MM3 (ref 0.1–0.9)
MONOCYTES NFR BLD AUTO: 7.5 % (ref 5–12)
NEUTROPHILS NFR BLD AUTO: 13.94 10*3/MM3 (ref 1.7–7)
NEUTROPHILS NFR BLD AUTO: 84.4 % (ref 42.7–76)
PLATELET # BLD AUTO: 308 10*3/MM3 (ref 140–450)
PMV BLD AUTO: 10.8 FL (ref 6–12)
POTASSIUM SERPL-SCNC: 4.5 MMOL/L (ref 3.5–5.2)
PROT SERPL-MCNC: 7.4 G/DL (ref 6–8.5)
RBC # BLD AUTO: 4.13 10*6/MM3 (ref 3.77–5.28)
SODIUM SERPL-SCNC: 133 MMOL/L (ref 136–145)
TRIGL SERPL-MCNC: 200 MG/DL (ref 0–150)
VLDLC SERPL-MCNC: 33 MG/DL (ref 5–40)
WBC # BLD AUTO: 16.52 10*3/MM3 (ref 3.4–10.8)

## 2021-08-12 PROCEDURE — 85025 COMPLETE CBC W/AUTO DIFF WBC: CPT | Performed by: FAMILY MEDICINE

## 2021-08-12 PROCEDURE — 80053 COMPREHEN METABOLIC PANEL: CPT | Performed by: FAMILY MEDICINE

## 2021-08-12 PROCEDURE — 99214 OFFICE O/P EST MOD 30 MIN: CPT | Performed by: FAMILY MEDICINE

## 2021-08-12 PROCEDURE — 83036 HEMOGLOBIN GLYCOSYLATED A1C: CPT | Performed by: FAMILY MEDICINE

## 2021-08-12 PROCEDURE — 80061 LIPID PANEL: CPT | Performed by: FAMILY MEDICINE

## 2021-08-12 NOTE — ASSESSMENT & PLAN NOTE
History of Anemia and CKD, not follow since Dec 2020.  Stat labs today given her history and current fatigue.  Vitals stable in office.

## 2021-08-12 NOTE — ASSESSMENT & PLAN NOTE
Asthma is Chronic .  The patient is experiencing States shortness of breath, however has not used albuterol inhaler in several months. States he has one but only uses it in the spring time. . She is experiencing Varies, not going out of the house much. .  Advise albuterol inhaler, obtain labs today to rule out issues from anemia

## 2021-08-12 NOTE — PROGRESS NOTES
"Chief Complaint  Establish Care, Hip Pain, Back Pain, and Shortness of Breath (a year and a half )    Subjective    History of Present Illness {CC  Problem List  Visit  Diagnosis   Encounters  Notes  Medications  Labs  Result Review Imaging  Media :23}     Alejandra Denny presents to [facility] to establish care.  Pt is new to me however previously seen Dr. Barreto.   Pt states she has been out of medication for several months.  She has not felt well. She is fatigue, no energy. Has issues with shortness of breath for over a year.  She continue to have issue with hip and back pain.    She has not check BS in several months after running out of test strips.       She states she is sleeping in her recliner. She states due to comfort for pain, not because of breathing.      She has not seen in of her specialist provider due to covid, she is just staying in the house.         History of Present Illness     Objective     Vital Signs:   /86 (BP Location: Left arm, Patient Position: Sitting, Cuff Size: Adult)   Pulse 66   Temp 97.3 °F (36.3 °C) (Temporal)   Resp 16   Ht 167.6 cm (66\")   Wt 87.8 kg (193 lb 9.6 oz)   SpO2 95%   BMI 31.25 kg/m²   Physical Exam  Constitutional:       General: She is not in acute distress.     Appearance: She is obese.      Comments: Sitting in wheelchair   Eyes:      Conjunctiva/sclera: Conjunctivae normal.   Cardiovascular:      Rate and Rhythm: Regular rhythm.      Heart sounds: Normal heart sounds. No murmur heard.     Pulmonary:      Effort: No respiratory distress.      Breath sounds: Normal breath sounds. No wheezing or rhonchi.   Abdominal:      General: Bowel sounds are normal. There is distension.      Tenderness: There is no abdominal tenderness.   Musculoskeletal:         General: Tenderness present.      Comments: bilateral LE swelling, ttp diffusely,erythematous        Result Review  Data Reviewed:{ Labs  Result Review  " Imaging  Med Tab  Media :23}   The following data was reviewed by: Hayley Zavala MD on 08/12/2021  Lab Results - Last 18 Months   Lab Units 08/12/21  1145 12/01/20  1035   GLUCOSE mg/dL  --  152*   BUN mg/dL 59* 39*   CREATININE mg/dL 5.39* 3.78*   EGFR IF NONAFRICN AM mL/min/1.73 8* 12*   EGFR IF AFRICN AM mL/min/1.73  --  14*   SODIUM mmol/L 133* 141   POTASSIUM mmol/L 4.5 4.5   CHLORIDE mmol/L 101 108*   CALCIUM mg/dL 9.5 8.9   ALBUMIN g/dL 3.80 3.80   BILIRUBIN mg/dL 0.3 0.3   ALK PHOS U/L 124* 119*   AST (SGOT) U/L 16 12   ALT (SGPT) U/L 14 9   TRIGLYCERIDES mg/dL 200*  --    HDL CHOL mg/dL 33*  --    VLDL CHOL mg/dL 33  --    LDL CHOL mg/dL 46  --    LDL/HDL RATIO  1.18  --    HEMOGLOBIN A1C % 6.97*  --    WBC 10*3/mm3 16.52*  --    RBC 10*6/mm3 4.13  --    HEMATOCRIT % 37.8  --    MCV fL 91.5  --    MCH pg 28.3  --             Current Outpatient Medications:   •  ACCU-CHEK FASTCLIX LANCETS misc, Test and record four times daily, Disp: , Rfl:   •  albuterol sulfate  (90 Base) MCG/ACT inhaler, Inhale 2 puffs Every 4 (Four) Hours As Needed for Wheezing., Disp: 18 g, Rfl: 3  •  albuterol sulfate  (90 Base) MCG/ACT inhaler, Inhale 2 puffs Every 6 (Six) Hours As Needed for Wheezing., Disp: 18 g, Rfl: 5  •  aspirin 81 MG tablet, Take 81 mg by mouth., Disp: , Rfl:   •  atorvastatin (LIPITOR) 40 MG tablet, TAKE 1 TABLET DAILY, Disp: 90 tablet, Rfl: 4  •  betamethasone dipropionate (DIPROLENE) 0.05 % cream, Apply  topically to the appropriate area as directed., Disp: , Rfl:   •  Blood Glucose Monitoring Suppl (ACCU-CHEK TEE SMARTVIEW) w/Device kit, U UTD, Disp: , Rfl: 5  •  cetirizine (zyrTEC) 10 MG tablet, Take 10 mg by mouth., Disp: , Rfl:   •  Cholecalciferol (VITAMIN D) 1000 units tablet, Take 1,000 Units by mouth., Disp: , Rfl:   •  clobetasol prop emollient base (TEMOVATE) 0.05 % emollient cream, APPLY TO AFFECTED AREA(S) ON THE SKIN TWICE A DAY, Disp: , Rfl:   •  glucose blood (ACCU-CHEK  SHAYNA PLUS) test strip, 1 strip by Other route., Disp: , Rfl:   •  hydrOXYzine (ATARAX) 10 MG tablet, Take 10 mg by mouth., Disp: , Rfl:   •  Insulin Glargine (LANTUS SOLOSTAR) 100 UNIT/ML injection pen, Inject 28 Units under the skin into the appropriate area as directed Daily., Disp: 30 pen, Rfl: 3  •  Insulin Lispro (HUMALOG KWIKPEN) 100 UNIT/ML solution pen-injector, INJECT 6 UNITS BREAKFAST, 10 UNITS LUNCH, AND 14 UNITS DINNER PLUS ADDITIONAL 30 UNITS SLIDING SCALE. DISCONTINUE NOVOLOG., Disp: , Rfl:   •  metoprolol tartrate (LOPRESSOR) 50 MG tablet, TAKE 1 TABLET TWICE A DAY, Disp: 60 tablet, Rfl: 0  •  montelukast (SINGULAIR) 10 MG tablet, TAKE 1 TABLET EVERY NIGHT, Disp: 90 tablet, Rfl: 4  •  pantoprazole (PROTONIX) 40 MG EC tablet, Take 1 tablet by mouth 2 (two) times a day., Disp: 180 tablet, Rfl: 2  •  SUMAtriptan (IMITREX) 100 MG tablet, TAKE 1 TABLET AT ONSET OF HEADACHE. MAY REPEAT DOSE ONE TIME IN 2 HOURS IF HEADACHE NOT RELIEVED (1 TABLET EVERY 2 HOURS AS NEEDED), Disp: 27 tablet, Rfl: 8  •  tiZANidine (ZANAFLEX) 4 MG tablet, Take 4 mg by mouth Every 8 (Eight) Hours As Needed., Disp: , Rfl:   •  torsemide (DEMADEX) 100 MG tablet, TAKE ONE-HALF (1/2) TABLET DAILY, Disp: 90 tablet, Rfl: 0  •  triamcinolone (KENALOG) 0.1 % cream, APPLY TOPICALLY TO THE APPROPRIATE AREA AS DIRECTED TWICE A DAY, Disp: 80 g, Rfl: 8     Assessment and Plan {CC Problem List  Visit Diagnosis  ROS  Review (Popup)  Health Maintenance  Quality  BestPractice  Medications  SmartSets  SnapShot Encounters  Media :23}   Problem List Items Addressed This Visit        Endocrine and Metabolic    Type 2 diabetes mellitus with renal manifestations, controlled (CMS/[facility])    Current Assessment & Plan     Renal condition is Chronic, lost to follow up. Stat labs today.  Refilled all medication.   Renal condition will be reassessed in 1 month.         Relevant Medications    Insulin Glargine (LANTUS SOLOSTAR) 100 UNIT/ML injection  pen    Other Relevant Orders    Hemoglobin A1c (Completed)       Genitourinary and Reproductive     CKD (chronic kidney disease) stage 4, GFR 15-29 ml/min (CMS/[facility])    Relevant Orders    Lipid panel (Completed)    Comprehensive metabolic panel (Completed)       Symptoms and Signs    Fatigue    Current Assessment & Plan     History of Anemia and CKD, not follow since Dec 2020.  Stat labs today given her history and current fatigue.  Vitals stable in office.          Relevant Orders    CBC & Differential    Hemoglobin A1c (Completed)      Other Visit Diagnoses     Encounter to establish care    -  Primary        Concern worsening anemia, elevated BS, and kidney disease causing her current conditions. Sitting in wheelchair weak to  chair.  Discuss my concern and the need for possible admission.  She does not want to go to [facility] [place], she lives close to [facility].  Stat labs today. Advise will call with results based evaluation in ED upon lab results.        Follow Up   Return in about 4 weeks (around 9/9/2021).  Patient was given instructions and counseling regarding her condition or for health maintenance advice. Please see specific information pulled into the AVS if appropriate.    EpicAct:_JATINDER_AMB_ORDERS,RunParams:STARTUPTYPE=FOLLOW    [email or web address]

## 2021-08-12 NOTE — ASSESSMENT & PLAN NOTE
Renal condition is Chronic, lost to follow up. Stat labs today.  Refilled all medication.   Renal condition will be reassessed in 1 month.

## 2021-08-31 ENCOUNTER — OFFICE VISIT (OUTPATIENT)
Dept: INTERNAL MEDICINE | Facility: CLINIC | Age: 71
End: 2021-08-31

## 2021-08-31 VITALS
SYSTOLIC BLOOD PRESSURE: 100 MMHG | BODY MASS INDEX: 31.26 KG/M2 | HEART RATE: 70 BPM | DIASTOLIC BLOOD PRESSURE: 80 MMHG | TEMPERATURE: 97.3 F | HEIGHT: 66 IN

## 2021-08-31 DIAGNOSIS — Z99.2 STAGE 5 CHRONIC KIDNEY DISEASE ON DIALYSIS (HCC): ICD-10-CM

## 2021-08-31 DIAGNOSIS — I48.0 PAF (PAROXYSMAL ATRIAL FIBRILLATION) (HCC): ICD-10-CM

## 2021-08-31 DIAGNOSIS — N83.8 OVARIAN MASS: ICD-10-CM

## 2021-08-31 DIAGNOSIS — E78.5 HYPERLIPIDEMIA, ACQUIRED: ICD-10-CM

## 2021-08-31 DIAGNOSIS — J30.9 ALLERGIC RHINITIS, UNSPECIFIED SEASONALITY, UNSPECIFIED TRIGGER: ICD-10-CM

## 2021-08-31 DIAGNOSIS — I10 ESSENTIAL HYPERTENSION: Primary | ICD-10-CM

## 2021-08-31 DIAGNOSIS — N18.6 STAGE 5 CHRONIC KIDNEY DISEASE ON DIALYSIS (HCC): ICD-10-CM

## 2021-08-31 PROBLEM — R93.5 ABNORMAL CT OF THE ABDOMEN: Status: ACTIVE | Noted: 2021-08-13

## 2021-08-31 PROBLEM — N17.9 ACUTE RENAL FAILURE SUPERIMPOSED ON STAGE 5 CHRONIC KIDNEY DISEASE, NOT ON CHRONIC DIALYSIS (HCC): Status: ACTIVE | Noted: 2021-08-13

## 2021-08-31 PROBLEM — Z95.1 HX OF CABG: Status: ACTIVE | Noted: 2021-08-17

## 2021-08-31 PROBLEM — R19.00 PELVIC MASS: Status: ACTIVE | Noted: 2021-08-16

## 2021-08-31 PROBLEM — R77.8 ELEVATED TROPONIN LEVEL NOT DUE MYOCARDIAL INFARCTION: Status: ACTIVE | Noted: 2021-08-13

## 2021-08-31 PROBLEM — N18.5 ACUTE RENAL FAILURE SUPERIMPOSED ON STAGE 5 CHRONIC KIDNEY DISEASE, NOT ON CHRONIC DIALYSIS (HCC): Status: ACTIVE | Noted: 2021-08-13

## 2021-08-31 PROBLEM — R74.8 ELEVATED LIPASE: Status: ACTIVE | Noted: 2021-08-13

## 2021-08-31 PROCEDURE — 1111F DSCHRG MED/CURRENT MED MERGE: CPT | Performed by: FAMILY MEDICINE

## 2021-08-31 PROCEDURE — 99496 TRANSJ CARE MGMT HIGH F2F 7D: CPT | Performed by: FAMILY MEDICINE

## 2021-08-31 RX ORDER — MONTELUKAST SODIUM 10 MG/1
10 TABLET ORAL NIGHTLY
Qty: 90 TABLET | Refills: 1 | Status: SHIPPED | OUTPATIENT
Start: 2021-08-31

## 2021-08-31 RX ORDER — LANOLIN ALCOHOL/MO/W.PET/CERES
3 CREAM (GRAM) TOPICAL NIGHTLY PRN
COMMUNITY
Start: 2021-08-25

## 2021-08-31 RX ORDER — METOPROLOL TARTRATE 50 MG/1
50 TABLET, FILM COATED ORAL 2 TIMES DAILY
Qty: 180 TABLET | Refills: 2 | Status: SHIPPED | OUTPATIENT
Start: 2021-08-31

## 2021-08-31 RX ORDER — ATORVASTATIN CALCIUM 40 MG/1
40 TABLET, FILM COATED ORAL DAILY
Qty: 90 TABLET | Refills: 2 | Status: SHIPPED | OUTPATIENT
Start: 2021-08-31

## 2021-08-31 RX ORDER — HYDROCODONE BITARTRATE AND ACETAMINOPHEN 5; 325 MG/1; MG/1
1 TABLET ORAL EVERY 6 HOURS PRN
COMMUNITY
Start: 2021-08-25

## 2021-08-31 NOTE — PROGRESS NOTES
Transitional Care Follow Up Visit  Subjective     Alejandra Denny is a 70 y.o. female who presents for a transitional care management visit.    Within 48 business hours after discharge our office contacted her via telephone to coordinate her care and needs.      I reviewed and discussed the details of that call along with the discharge summary, hospital problems, inpatient lab results, inpatient diagnostic studies, and consultation reports with Alejandra.     Current outpatient and discharge medications have been reconciled for the patient.  Reviewed by: Hayley Zavala MD      No flowsheet data found.  Risk for Readmission (LACE) No data recorded    History of Present Illness   Course During Hospital Stay:    Pt admitted from Aug 12 to Aug 25, 2021    70-year-old woman with a history of coronary disease and coronary bypass graft who came in with abdominal pain that was chronic and worsening recently requiring visit to the emergency room. Her work-up in the emergency room included a CT scan of the abdomen and pelvis showing both an ovarian mass and a renal mass. She was suspected of having an ovarian malignancy and possible renal malignancy as well. Consult to nephrology and GYN oncology was made. Plans for a joint surgery following the biopsy done on the day of discharge were made. The patient was found to have paroxysmal atrial fibrillation. Therefore her risk of stroke is increased and anticoagulation was recommended, started the day after discharge due to her biopsy on 8/25/2021. The cardiology consult resulted in the diagnosis of type II demand ischemia secondary to severe underlying chronic kidney disease with large pelvic mass. Conservative medical management given and her ejection fraction on echocardiogram was 45% also noted by cardiology. Her [id number] score of 4 indicated the need for anticoagulation if not contraindicated    Pt states she is suppose to go back into the hospital on Sept 27 for surgery  to remove ovarian mass.        Pt is short of breath, oxygen saturations 66, up to 96 on 2 liters. Taken off oxygen oxygen sats drop to 77.  Discuss with patient and her sister my concern needing further evaluation.  Pt only goes to [facility], refused to go to [facility]. Sister states once she realize she is there she will be upset.    Pt is not very oriented, not answering questions or following commands.    Ambulance called to help transport to the hospital of choice.    Pt sister oxygen drop a few days ago with PT in the 80's but came up after some time to 92.      Fluid over load, per sister legs little improved, dialysis yesterday. Has had 2 outpatient sessions since discharge.       The following portions of the patient's history were reviewed and updated as appropriate: allergies, current medications, past family history, past medical history, past social history, past surgical history and problem list.    Review of Systems    Vitals:    08/31/21 1126   BP: 100/80   Pulse: 70   Temp: 97.3 °F (36.3 °C)       Objective   Physical Exam  Constitutional:       General: She is in acute distress.      Appearance: She is ill-appearing.   Cardiovascular:      Rate and Rhythm: Tachycardia present. Rhythm irregular.   Pulmonary:      Effort: Respiratory distress present.      Breath sounds: Wheezing and rhonchi present.   Abdominal:      General: There is distension.   Musculoskeletal:      Right lower leg: Edema present.      Left lower leg: Edema present.         Assessment/Plan   Problems Addressed this Visit        Allergies and Adverse Reactions    Allergic rhinitis    Relevant Medications    montelukast (SINGULAIR) 10 MG tablet       Cardiac and Vasculature    Hypertension - Primary    Relevant Medications    metoprolol tartrate (LOPRESSOR) 50 MG tablet    PAF (paroxysmal atrial fibrillation) (CMS/HCC)    Relevant Medications    metoprolol tartrate (LOPRESSOR) 50 MG tablet      Other Visit Diagnoses      Hyperlipidemia, acquired        Relevant Medications    atorvastatin (LIPITOR) 40 MG tablet    Ovarian mass        Stage 5 chronic kidney disease on dialysis (CMS/[facility])          Diagnoses       Codes Comments    Essential hypertension    -  Primary ICD-10-CM: I10  ICD-9-CM: 401.9     Hyperlipidemia, acquired     ICD-10-CM: E78.5  ICD-9-CM: 272.4     Allergic rhinitis, unspecified seasonality, unspecified trigger     ICD-10-CM: J30.9  ICD-9-CM: 477.9     Ovarian mass     ICD-10-CM: N83.8  ICD-9-CM: 620.8     PAF (paroxysmal atrial fibrillation) (CMS/[facility])     ICD-10-CM: I48.0  ICD-9-CM: 427.31     Stage 5 chronic kidney disease on dialysis (CMS/[facility])     ICD-10-CM: N18.6, Z99.2  ICD-9-CM: 585.6         Pt sent to ER for further evaluation for her hypoxia in office. Unable to get oxygen to stabilize in office.

## 2021-09-29 PROBLEM — N28.89 LEFT RENAL MASS: Status: ACTIVE | Noted: 2021-09-29
